# Patient Record
Sex: MALE | Race: ASIAN
[De-identification: names, ages, dates, MRNs, and addresses within clinical notes are randomized per-mention and may not be internally consistent; named-entity substitution may affect disease eponyms.]

---

## 2019-04-20 NOTE — ED
Dizziness





- HPI Summary


HPI Summary: 


The patient is a 23 y/o M presenting to [facility] with a chief complaint of 

intermittent episodes of dizziness starting at 1400 last night while waiting in 

the airport for his flight. He was sitting down for a few hours when the 

dizziness started; he noticed that it occurred every 30 minutes or so with the 

symptoms alleviating if he stood up and moved around. The symptoms continued 

while on the flight and after landing at 2330. He additionally c/o heart 

palpitations without chest pain. The dizziness is described as a spinning 

motion but also some feeling of syncope; he did not have a syncopal episode. He 

is otherwise healthy.





- History Of Current Complaint


Chief Complaint: EDDizziness


Stated Complaint: "DIZZY" PER PT


Time Seen by Provider: 04/20/19 03:04


Hx Obtained From: Patient


Onset/Duration: Still Present


Timing: Hours - starting at 1400


Severity Initially: Moderate


Severity Currently: Moderate


Character: Head Spinning, Dizzy


Aggravating Factor(s): Other - sitting


Alleviating Factor(s): Other - ambulating


Associated Signs And Symptoms: Positive: Palpitations.  Negative: Chest Pain





PMH/Surg Hx/FS Hx/Imm Hx


Endocrine/Hematology History: 


   Denies: Hx Diabetes


Respiratory History: 


   Denies: Hx Asthma


Opthamlomology History: 


   Denies: Hx Legally Blind


EENT History: 


   Denies: Hx Deafness





- Surgical History


Surgery Procedure, Year, and Place: none


Infectious Disease History: No


Infectious Disease History: 


   Denies: Traveled Outside the [place] in Last 30 Days





- Family History


Known Family History: 


   Negative: Diabetes





- Social History


Occupation: Student


Alcohol Use: unknown


Hx Substance Use: No


Substance Use Type: Reports: None


Hx Tobacco Use: No


Smoking Status (MU): Never Smoked Tobacco





Review of Systems


Positive: Palpitations.  Negative: Chest Pain


Neurological: Other - intermittent dizziness with feeling of vertigo


All Other Systems Reviewed And Are Negative: Yes





Physical Exam





- Summary


Physical Exam Summary: 


Appearance: Well-appearing, Well-nourished, lying in bed comfortably


Skin: Warm, dry, no obvious rash


Eyes: sclera anicteric, no conjunctival pallor


ENT: mucous membranes moist, pharynx appears normal


Neck: Supple, nontender


Respiratory: Clear to auscultation, no signs of respiratory distress


Cardiovascular: Normal S1, S2. No murmurs. Normal distal pulses in tibial and 

radial bilaterally.


Abdomen: Soft, nontender, normal active bowel sounds present


Musculoskeletal: Normal, Strength/ROM Intact


Neurological: A&Ox3, awake and alert, mentation is normal, speech is fluent and 

appropriate


Psychiatric: affect is normal, does not appear anxious or depressed


Triage Information Reviewed: Yes


Vital Signs On Initial Exam: 


 Initial Vitals











Temp Pulse Resp BP Pulse Ox


 


 98.9 F   101   20   142/101   98 


 


 04/20/19 00:29  04/20/19 00:29  04/20/19 00:29  04/20/19 00:29  04/20/19 00:29











Vital Signs Reviewed: Yes





Diagnostics





- Vital Signs


 Vital Signs











  Temp Pulse Resp BP Pulse Ox


 


 04/20/19 04:28  98.2 F  89  16  128/95  100


 


 04/20/19 04:01   91    100


 


 04/20/19 03:58   89   128/95  100


 


 04/20/19 03:28   95   140/104  98


 


 04/20/19 03:01   88    100


 


 04/20/19 02:58   94   135/98  100


 


 04/20/19 02:57   114    97


 


 04/20/19 00:29  98.9 F  101  20  142/101  98














- Laboratory


Lab Results: 


 Lab Results











  04/20/19 04/20/19 04/20/19 Range/Units





  03:15 03:15 03:15 


 


WBC  11.7 H    (3.5-10.8)  10^3/uL


 


RBC  5.53 H    (4.18-5.48)  10^6 /uL


 


Hgb  14.4    (14.0-18.0)  g/dL


 


Hct  43    (36-46)  %


 


MCV  78 L    (80-94)  fL


 


MCH  26 L    (27-31)  pg


 


MCHC  33    (31-36)  g/dL


 


RDW  14    (10.5-15)  %


 


Plt Count  308    (150-450)  10^3/uL


 


MPV  8.5    (7.4-10.4)  fL


 


Neut % (Auto)  66.1    %


 


Lymph % (Auto)  24.2    %


 


Mono % (Auto)  8.5    %


 


Eos % (Auto)  0.9    %


 


Baso % (Auto)  0.3    %


 


Absolute Neuts (auto)  7.7    (1.5-7.7)  10^3/ul


 


Absolute Lymphs (auto)  2.8    (1.0-4.8)  10^3/ul


 


Absolute Monos (auto)  1.0 H    (0-0.8)  10^3/ul


 


Absolute Eos (auto)  0.1    (0-0.6)  10^3/ul


 


Absolute Basos (auto)  0    (0-0.2)  10^3/ul


 


Absolute Nucleated RBC  0    10^3/ul


 


Nucleated RBC %  0.2    


 


Sodium   136   (135-145)  mmol/L


 


Potassium   3.6   (3.5-5.0)  mmol/L


 


Chloride   104   (101-111)  mmol/L


 


Carbon Dioxide   25   (22-32)  mmol/L


 


Anion Gap   7   (2-11)  mmol/L


 


BUN   10   (6-24)  mg/dL


 


Creatinine   0.80   (0.67-1.17)  mg/dL


 


Est GFR ( Amer)   142.5   (>60)  


 


Est GFR (Non-Af Amer)   117.8   (>60)  


 


BUN/Creatinine Ratio   12.5   (8-20)  


 


Glucose   110 H   ()  mg/dL


 


Lactic Acid    1.3  (0.5-2.0)  mmol/L


 


Calcium   9.3   (8.6-10.3)  mg/dL


 


Total Bilirubin   0.30   (0.2-1.0)  mg/dL


 


AST   17   (13-39)  U/L


 


ALT   15   (7-52)  U/L


 


Alkaline Phosphatase   78   ()  U/L


 


Total Protein   7.8   (6.4-8.9)  g/dL


 


Albumin   4.6   (3.2-5.2)  g/dL


 


Globulin   3.2   (2-4)  g/dL


 


Albumin/Globulin Ratio   1.4   (1-3)  


 


Urine Color     


 


Urine Appearance     


 


Urine pH     (5-9)  


 


Ur Specific Gravity     (1.010-1.030)  


 


Urine Protein     (Negative)  


 


Urine Ketones     (Negative)  


 


Urine Blood     (Negative)  


 


Urine Nitrate     (Negative)  


 


Urine Bilirubin     (Negative)  


 


Urine Urobilinogen     (Negative)  


 


Ur Leukocyte Esterase     (Negative)  


 


Urine Glucose     (Negative)  














  04/20/19 Range/Units





  03:38 


 


WBC   (3.5-10.8)  10^3/uL


 


RBC   (4.18-5.48)  10^6 /uL


 


Hgb   (14.0-18.0)  g/dL


 


Hct   (36-46)  %


 


MCV   (80-94)  fL


 


MCH   (27-31)  pg


 


MCHC   (31-36)  g/dL


 


RDW   (10.5-15)  %


 


Plt Count   (150-450)  10^3/uL


 


MPV   (7.4-10.4)  fL


 


Neut % (Auto)   %


 


Lymph % (Auto)   %


 


Mono % (Auto)   %


 


Eos % (Auto)   %


 


Baso % (Auto)   %


 


Absolute Neuts (auto)   (1.5-7.7)  10^3/ul


 


Absolute Lymphs (auto)   (1.0-4.8)  10^3/ul


 


Absolute Monos (auto)   (0-0.8)  10^3/ul


 


Absolute Eos (auto)   (0-0.6)  10^3/ul


 


Absolute Basos (auto)   (0-0.2)  10^3/ul


 


Absolute Nucleated RBC   10^3/ul


 


Nucleated RBC %   


 


Sodium   (135-145)  mmol/L


 


Potassium   (3.5-5.0)  mmol/L


 


Chloride   (101-111)  mmol/L


 


Carbon Dioxide   (22-32)  mmol/L


 


Anion Gap   (2-11)  mmol/L


 


BUN   (6-24)  mg/dL


 


Creatinine   (0.67-1.17)  mg/dL


 


Est GFR ( Amer)   (>60)  


 


Est GFR (Non-Af Amer)   (>60)  


 


BUN/Creatinine Ratio   (8-20)  


 


Glucose   ()  mg/dL


 


Lactic Acid   (0.5-2.0)  mmol/L


 


Calcium   (8.6-10.3)  mg/dL


 


Total Bilirubin   (0.2-1.0)  mg/dL


 


AST   (13-39)  U/L


 


ALT   (7-52)  U/L


 


Alkaline Phosphatase   ()  U/L


 


Total Protein   (6.4-8.9)  g/dL


 


Albumin   (3.2-5.2)  g/dL


 


Globulin   (2-4)  g/dL


 


Albumin/Globulin Ratio   (1-3)  


 


Urine Color  Yellow  


 


Urine Appearance  Clear  


 


Urine pH  7.0  (5-9)  


 


Ur Specific Gravity  1.023  (1.010-1.030)  


 


Urine Protein  Negative  (Negative)  


 


Urine Ketones  Negative  (Negative)  


 


Urine Blood  Negative  (Negative)  


 


Urine Nitrate  Negative  (Negative)  


 


Urine Bilirubin  Negative  (Negative)  


 


Urine Urobilinogen  Negative  (Negative)  


 


Ur Leukocyte Esterase  Negative  (Negative)  


 


Urine Glucose  Negative  (Negative)  











Result Diagrams: 


 04/20/19 03:15





 04/20/19 03:15


Lab Statement: Any lab studies that have been ordered have been reviewed, and 

results considered in the medical decision making process.





- EKG


  ** 0044


Cardiac Rate: Tachycardia - 101 BPM


EKG Rhythm: Sinus Tachycardia


Summary of EKG Findings: Sinus tachycardia at 101 BPM, P waves, QRS complex, 

and T waves are within normal limits, T waves and intervals are normal, no 

ischemic changes.





Re-Evaluation





- Re-Evaluation


  ** First Eval


Re-Evaluation Time: 04:00


Change: Unchanged


Comment: We spoke concerning results and discharge home.





Dizzy Course/Dx





- Course


Course Of Treatment: The patient is a 23 y/o M with a chief complaint of 

intermittent episodes of dizziness starting at 1400 last night while waiting in 

the airport for his flight. The dizziness is described as a spinning; no 

syncope. He additionally c/o heart palpitations without chest pain. Upon 

physical exam, the patient exhibits no acute abnormalities. Blood work reveals 

elevated WBCs, RBCs, abs monos, glucose, and depressed MCV, MCH. UA is 

negative. EKG shows sinus tachycardia at 101 BPM. He is diagnosed with near 

syncope. He will be discharged home with follow up with PCP in 2-3 days. He 

agrees with this plan and understands the need for return to the ED for any new 

or worsening symptoms.





- Diagnoses


Provider Diagnoses: 


 Near syncope








Discharge





- Sign-Out/Discharge


Documenting (check all that apply): Patient Departure


Patient Received Moderate/Deep Sedation with Procedure: No





- Discharge Plan


Condition: Good


Disposition: HOME


Patient Education Materials:  Near Syncope (ED)


Referrals: 


Charlene Aguilar NP [Primary Care Provider] - 3 Days


[facility] [Outside] - 3 Days


Additional Instructions: 


We did not find any evidence in the tests we did tonight to indicate a serious 

problem. I recommend resting over the weekend and paying attention to your 

symptoms. If you seem to be getting worse, we should see you back here. 

Sometimes the only way to make a diagnosis is to follow the illness over time.





- Billing Disposition and Condition


Condition: GOOD


Disposition: Home





- Attestation Statements


Document Initiated by Scribe: Yes


Documenting Scribe: Yvette Serra


Provider For Whom Manuelibe is Documenting (Include Credential): Dr. Jeevan Persaud MD


Scribe Attestation: 


I, Yvette Serra, scribed for Dr. Jeevan Persaud MD on 04/20/19 at 0658. 


Scribe Documentation Reviewed: Yes


Provider Attestation: 


The documentation as recorded by the scribe, Yvette Serra accurately reflects 

the service I personally performed and the decisions made by me, Dr. Jeevan Persaud MD


Status of Scribe Document: Viewed

## 2019-07-16 ENCOUNTER — HOSPITAL ENCOUNTER (EMERGENCY)
Dept: HOSPITAL 25 - ED | Age: 24
Discharge: HOME | End: 2019-07-16
Payer: COMMERCIAL

## 2019-07-16 VITALS — DIASTOLIC BLOOD PRESSURE: 90 MMHG | SYSTOLIC BLOOD PRESSURE: 132 MMHG

## 2019-07-16 DIAGNOSIS — I10: Primary | ICD-10-CM

## 2019-07-16 DIAGNOSIS — L53.9: ICD-10-CM

## 2019-07-16 DIAGNOSIS — R50.9: ICD-10-CM

## 2019-07-16 DIAGNOSIS — Z82.49: ICD-10-CM

## 2019-07-16 DIAGNOSIS — R42: ICD-10-CM

## 2019-07-16 DIAGNOSIS — Z88.0: ICD-10-CM

## 2019-07-16 LAB
ALBUMIN SERPL BCG-MCNC: 4.7 G/DL (ref 3.2–5.2)
ALBUMIN/GLOB SERPL: 1.5 {RATIO} (ref 1–3)
ALP SERPL-CCNC: 82 U/L (ref 34–104)
ALT SERPL W P-5'-P-CCNC: 12 U/L (ref 7–52)
ANION GAP SERPL CALC-SCNC: 8 MMOL/L (ref 2–11)
AST SERPL-CCNC: 15 U/L (ref 13–39)
BASOPHILS # BLD AUTO: 0.1 10^3/UL (ref 0–0.2)
BUN SERPL-MCNC: 10 MG/DL (ref 6–24)
BUN/CREAT SERPL: 9.9 (ref 8–20)
CALCIUM SERPL-MCNC: 9.4 MG/DL (ref 8.6–10.3)
CHLORIDE SERPL-SCNC: 102 MMOL/L (ref 101–111)
EOSINOPHIL # BLD AUTO: 0.1 10^3/UL (ref 0–0.6)
GLOBULIN SER CALC-MCNC: 3.1 G/DL (ref 2–4)
GLUCOSE SERPL-MCNC: 108 MG/DL (ref 70–100)
HCO3 SERPL-SCNC: 25 MMOL/L (ref 22–32)
HCT VFR BLD AUTO: 44 % (ref 42–52)
HGB BLD-MCNC: 15.2 G/DL (ref 14–18)
LYMPHOCYTES # BLD AUTO: 2.9 10^3/UL (ref 1–4.8)
MCH RBC QN AUTO: 26 PG (ref 27–31)
MCHC RBC AUTO-ENTMCNC: 34 G/DL (ref 31–36)
MCV RBC AUTO: 77 FL (ref 80–94)
MONOCYTES # BLD AUTO: 0.9 10^3/UL (ref 0–0.8)
NEUTROPHILS # BLD AUTO: 6.2 10^3/UL (ref 1.5–7.7)
NRBC # BLD AUTO: 0 10^3/UL
NRBC BLD QL AUTO: 0.1
PLATELET # BLD AUTO: 276 10^3/UL (ref 150–450)
POTASSIUM SERPL-SCNC: 3.4 MMOL/L (ref 3.5–5)
PROT SERPL-MCNC: 7.8 G/DL (ref 6.4–8.9)
RBC # BLD AUTO: 5.75 10^6 /UL (ref 4.18–5.48)
SODIUM SERPL-SCNC: 135 MMOL/L (ref 135–145)
TROPONIN I SERPL-MCNC: 0 NG/ML (ref ?–0.04)
TSH SERPL-ACNC: 6.25 MCIU/ML (ref 0.34–5.6)
WBC # BLD AUTO: 10.2 10^3/UL (ref 3.5–10.8)

## 2019-07-16 PROCEDURE — 84443 ASSAY THYROID STIM HORMONE: CPT

## 2019-07-16 PROCEDURE — 81003 URINALYSIS AUTO W/O SCOPE: CPT

## 2019-07-16 PROCEDURE — 85025 COMPLETE CBC W/AUTO DIFF WBC: CPT

## 2019-07-16 PROCEDURE — 99283 EMERGENCY DEPT VISIT LOW MDM: CPT

## 2019-07-16 PROCEDURE — 36415 COLL VENOUS BLD VENIPUNCTURE: CPT

## 2019-07-16 PROCEDURE — 80053 COMPREHEN METABOLIC PANEL: CPT

## 2019-07-16 PROCEDURE — 93005 ELECTROCARDIOGRAM TRACING: CPT

## 2019-07-16 PROCEDURE — 84484 ASSAY OF TROPONIN QUANT: CPT

## 2019-07-16 NOTE — ED
Complex/Multi-Sys Presentation





- HPI Summary


HPI Summary: 


This patient is a 24 year old M presenting to [facility] accompanied by a male 

 with a chief complaint of rapid HR and dizziness since a few hours 

ago. Patient states that his heart was beating very fast and then slows down 

after 5 seconds. He notes this happened 3 times. Patient also states that he 

had red eyes for the past 4 days. His doctor gave him eye drops from his 

dehydrated eyes yesterday. Patient reports near syncopal episodes, "burning" 

sensation in his eyes, and food poisoning yesterday. Patient denies anxiety, 

panic attacks, HA, nausea, vomitting. PMHx of high BP for 10 years, but does 

not take medication for it. Patient has family hx of HTN. The patient rates the 

pain 0/10 in severity. Symptoms aggravated by nothing. Symptoms alleviated by 

nothing.





- History Of Current Complaint


Chief Complaint: EDDysrhythmPalp


Time Seen by Provider: 07/16/19 02:40


Hx Obtained From: Patient


Onset/Duration: Sudden Onset, Lasting Hours - 3


Timing: Intermittent, Lasting: - 3 episodes, last 5 seconds each


Severity Currently: None


Aggravating Factor(s): nothing


Alleviating Factor(s): nothing


Associated Signs And Symptoms: Positive: Dizziness, Syncope - near syncopal 

episodes, Other - positive - rapid HR, erythema of eyes, "burning" of eyes, 

food poisoning. negative - anxiety and panic attacks.  Negative: Headache, 

Nausea, Vomiting





- Allergies/Home Medications


Allergies/Adverse Reactions: 


 Allergies











Allergy/AdvReac Type Severity Reaction Status Date / Time


 


Penicillins Allergy  Unknown Verified 07/16/19 02:32





   Reaction  





   Details  














PMH/Surg Hx/FS Hx/Imm Hx


Previously Healthy: No


Endocrine/Hematology History: 


   Denies: Hx Diabetes


Respiratory History: 


   Denies: Hx Asthma


Sensory History: 


   Denies: Hx Legally Blind, Hx Deafness


Opthamlomology History: 


   Denies: Hx Legally Blind





- Surgical History


Surgical History: None


Surgery Procedure, Year, and Place: none


Infectious Disease History: No


Infectious Disease History: 


   Denies: Traveled Outside the [place] in Last 30 Days





- Family History


Known Family History: Positive: Hypertension


   Negative: Diabetes





- Social History


Alcohol Use: None


Hx Substance Use: No


Substance Use Type: Reports: None


Hx Tobacco Use: No


Smoking Status (MU): Never Smoked Tobacco


Do You Chew or Dip Tobacco: No


Have You Chewed or Dipped Tobacco in the LAST YEAR: No


Have You Smoked in the Last Year: No





Review of Systems


Positive: Erythema - positive - "burning" sensation of eyes, Other - positive - 

""


Cardiovascular: Other - positive - rapid HR


Gastrointestinal: Other - positive - food poisoning


Negative: Vomiting, Nausea


Neurological: Other - positive - dizziness. negative - HA


Psychological: Other - negative - panic attacks


Negative: Anxious


All Other Systems Reviewed And Are Negative: Yes





Physical Exam





- Summary


Physical Exam Summary: 





VITAL SIGNS: Reviewed.


GENERAL:  Patient is a well-developed and nourished (MALE OR FEMALE) who is 

lying comfortable in the stretcher. Patient is not in any acute respiratory 

distress.


HEAD AND FACE: No signs of trauma. No ecchymosis, hematomas or skull 

depressions. No sinus tenderness.


EYES: PERRLA, EOMI x 2, No injected conjunctiva, no nystagmus.


EARS: Hearing grossly intact. Ear canals and tympanic membranes are within 

normal limits.


MOUTH: Oropharynx within normal limits.


NECK: Supple, trachea is midline, no adenopathy, no JVD, no carotid bruit, no c-

spine tenderness, neck with full ROM


CHEST: Symmetric, no tenderness at palpation


LUNGS: Clear to auscultation bilaterally. No wheezing or crackles.


CVS: Regular rate and rhythm, S1 and S2 present, no murmurs or gallops 

appreciated.


ABDOMEN: Soft, non-tender. No signs of distention. No rebound no guarding, and 

no masses palpated. Bowel sounds are normal.


EXTREMITIES: FROM in all major joints, no edema, no cyanosis or clubbing.


NEURO: Alert and oriented x 3. No acute neurological deficits. Speech is normal 

and follows commands.


SKIN: Dry and warm





Triage Information Reviewed: Yes


Vital Signs On Initial Exam: 


 Initial Vitals











Temp Pulse Resp BP Pulse Ox


 


 98.0 F   80   15   139/107   100 


 


 07/16/19 02:25  07/16/19 02:25  07/16/19 02:25  07/16/19 02:25  07/16/19 02:25











Vital Signs Reviewed: Yes





Diagnostics





- Vital Signs


 Vital Signs











  Temp Pulse Resp BP Pulse Ox


 


 07/16/19 03:21   90  20  144/93  100


 


 07/16/19 03:15   95  15  151/94  97


 


 07/16/19 03:01   88  16   100


 


 07/16/19 03:00   91  16  138/91  100


 


 07/16/19 02:54   98  18  149/106  100


 


 07/16/19 02:51   83   157/101  100


 


 07/16/19 02:50   89    100


 


 07/16/19 02:25  98.0 F  80  15  139/107  100














- Laboratory


Lab Results: 


 Lab Results











  07/16/19 07/16/19 07/16/19 Range/Units





  03:06 03:06 03:40 


 


WBC  10.2    (3.5-10.8)  10^3/uL


 


RBC  5.75 H    (4.18-5.48)  10^6 /uL


 


Hgb  15.2    (14.0-18.0)  g/dL


 


Hct  44    (42-52)  %


 


MCV  77 L    (80-94)  fL


 


MCH  26 L    (27-31)  pg


 


MCHC  34    (31-36)  g/dL


 


RDW  14    (10-15)  %


 


Plt Count  276    (150-450)  10^3/uL


 


MPV  7.9    (7.4-10.4)  fL


 


Neut % (Auto)  60.8    %


 


Lymph % (Auto)  28.8    %


 


Mono % (Auto)  8.4    %


 


Eos % (Auto)  1.2    %


 


Baso % (Auto)  0.8    %


 


Absolute Neuts (auto)  6.2    (1.5-7.7)  10^3/ul


 


Absolute Lymphs (auto)  2.9    (1.0-4.8)  10^3/ul


 


Absolute Monos (auto)  0.9 H    (0-0.8)  10^3/ul


 


Absolute Eos (auto)  0.1    (0-0.6)  10^3/ul


 


Absolute Basos (auto)  0.1    (0-0.2)  10^3/ul


 


Absolute Nucleated RBC  0.0    10^3/ul


 


Nucleated RBC %  0.1    


 


Sodium   135   (135-145)  mmol/L


 


Potassium   3.4 L   (3.5-5.0)  mmol/L


 


Chloride   102   (101-111)  mmol/L


 


Carbon Dioxide   25   (22-32)  mmol/L


 


Anion Gap   8   (2-11)  mmol/L


 


BUN   10   (6-24)  mg/dL


 


Creatinine   1.01   (0.67-1.17)  mg/dL


 


Est GFR ( Amer)   108.9   (>60)  


 


Est GFR (Non-Af Amer)   90.0   (>60)  


 


BUN/Creatinine Ratio   9.9   (8-20)  


 


Glucose   108 H   ()  mg/dL


 


Calcium   9.4   (8.6-10.3)  mg/dL


 


Total Bilirubin   0.40   (0.2-1.0)  mg/dL


 


AST   15   (13-39)  U/L


 


ALT   12   (7-52)  U/L


 


Alkaline Phosphatase   82   ()  U/L


 


Troponin I   0.00   (<0.04)  ng/mL


 


Total Protein   7.8   (6.4-8.9)  g/dL


 


Albumin   4.7   (3.2-5.2)  g/dL


 


Globulin   3.1   (2-4)  g/dL


 


Albumin/Globulin Ratio   1.5   (1-3)  


 


TSH   Pending   


 


Urine Color    Yellow  


 


Urine Appearance    Clear  


 


Urine pH    6.0  (5-9)  


 


Ur Specific Gravity    1.012  (1.010-1.030)  


 


Urine Protein    Negative  (Negative)  


 


Urine Ketones    Negative  (Negative)  


 


Urine Blood    Negative  (Negative)  


 


Urine Nitrate    Negative  (Negative)  


 


Urine Bilirubin    Negative  (Negative)  


 


Urine Urobilinogen    Negative  (Negative)  


 


Ur Leukocyte Esterase    Negative  (Negative)  


 


Urine Glucose    Negative  (Negative)  











Result Diagrams: 


 07/16/19 03:06





 07/16/19 03:06


Lab Statement: Any lab studies that have been ordered have been reviewed, and 

results considered in the medical decision making process.





- EKG


  ** 0230


Cardiac Rate: NL - 72 BPM


EKG Rhythm: Sinus Rhythm


Summary of EKG Findings: sinus rhythm, 72 BPM, non specific T wave change and 

inferior leads





Complex Multi-Symp Course/Dx


Course Of Treatment: This patient is a 24 year old M presenting to [facility] 

accompanied by a male  with a chief complaint of rapid HR and 

dizziness since a few hours ago. Patient states that his heart was beating very 

fast and then slows down after 5 seconds. He notes this happened 3 times. 

Patient also states that he had red eyes for the past 4 days. His doctor gave 

him eye drops from his dehydrated eyes yesterday. Patient reports near syncopal 

episodes, "burning" sensation in his eyes, and food poisoning yesterday. 

Patient denies anxiety, panic attacks, HA, nausea, vomitting. PMHx of high BP 

for 10 years, but does not take medication for it. Patient has family hx of 

HTN. The patient rates the pain 0/10 in severity. Symptoms aggravated by 

nothing. Symptoms alleviated by nothing.  Physical exam findings show no 

remarkable findings.  Lab results show RBC 5.75, MCV 77, MCH 26, absolute monos 

0.9, potassium 3.4, glucose 108, TSH 6.25.  EKG at 0230 shows sinus rhythm, 72 

BPM, non specific T wave change and inferior leads.  During the ED course, the 

patient was given Catapres.  Final diagnoses are HTN and dizziness. Patient is 

agreeable to discharge and was told to follow up with a primary care provider 

within 1-2 days. Patient was also told to return to the ED for any new or 

worsening symptoms.





- Diagnoses


Provider Diagnoses: 


 HTN (hypertension), Dizziness








Discharge





- Sign-Out/Discharge


Documenting (check all that apply): Patient Departure - discharge


Patient Received Moderate/Deep Sedation with Procedure: No





- Discharge Plan


Condition: Stable


Disposition: HOME


Prescriptions: 


Lisinopril TAB* [Prinivil TAB 5 MG*] 5 mg PO DAILY #30 tab


Patient Education Materials:  Chronic Hypertension (ED), Dizziness (ED)


Referrals: 


No Primary Care Phys,NOPCP [Primary Care Provider] - 


[facility] [Outside] - 1 Day


Additional Instructions: 


Follow up with a primary care provider within 1-2 days. Return to the ED for 

any new or worsening symptoms. 





- Attestation Statements


Document Initiated by Scribe: Yes


Documenting Scribe: Chad Durand


Provider For Whom Brandt is Documenting (Include Credential): Dr. Lios Santos MD


Scribe Attestation: 


Chad VERDUZCO, scribed for Dr. Lois Santos MD on 07/16/19 at 0430. 


Status of Scribe Document: Ready